# Patient Record
Sex: FEMALE | Race: WHITE
[De-identification: names, ages, dates, MRNs, and addresses within clinical notes are randomized per-mention and may not be internally consistent; named-entity substitution may affect disease eponyms.]

---

## 2019-09-30 ENCOUNTER — HOSPITAL ENCOUNTER (INPATIENT)
Dept: HOSPITAL 63 - GEROPSY | Age: 64
LOS: 11 days | Discharge: HOME | DRG: 885 | End: 2019-10-11
Attending: PSYCHIATRY & NEUROLOGY | Admitting: PSYCHIATRY & NEUROLOGY
Payer: COMMERCIAL

## 2019-09-30 VITALS — DIASTOLIC BLOOD PRESSURE: 69 MMHG | SYSTOLIC BLOOD PRESSURE: 127 MMHG

## 2019-09-30 VITALS — WEIGHT: 126.06 LBS | HEIGHT: 67 IN | BODY MASS INDEX: 19.79 KG/M2

## 2019-09-30 DIAGNOSIS — L30.9: ICD-10-CM

## 2019-09-30 DIAGNOSIS — R45.851: ICD-10-CM

## 2019-09-30 DIAGNOSIS — E44.1: ICD-10-CM

## 2019-09-30 DIAGNOSIS — R13.10: ICD-10-CM

## 2019-09-30 DIAGNOSIS — F63.9: ICD-10-CM

## 2019-09-30 DIAGNOSIS — N39.0: ICD-10-CM

## 2019-09-30 DIAGNOSIS — F41.9: ICD-10-CM

## 2019-09-30 DIAGNOSIS — G30.0: ICD-10-CM

## 2019-09-30 DIAGNOSIS — E87.6: ICD-10-CM

## 2019-09-30 DIAGNOSIS — Z79.899: ICD-10-CM

## 2019-09-30 DIAGNOSIS — Z87.440: ICD-10-CM

## 2019-09-30 DIAGNOSIS — F01.51: ICD-10-CM

## 2019-09-30 DIAGNOSIS — F31.9: Primary | ICD-10-CM

## 2019-09-30 DIAGNOSIS — M19.90: ICD-10-CM

## 2019-09-30 DIAGNOSIS — H54.7: ICD-10-CM

## 2019-09-30 DIAGNOSIS — G47.00: ICD-10-CM

## 2019-09-30 PROCEDURE — 83540 ASSAY OF IRON: CPT

## 2019-09-30 PROCEDURE — 82607 VITAMIN B-12: CPT

## 2019-09-30 PROCEDURE — 36415 COLL VENOUS BLD VENIPUNCTURE: CPT

## 2019-09-30 PROCEDURE — 85025 COMPLETE CBC W/AUTO DIFF WBC: CPT

## 2019-09-30 PROCEDURE — 84436 ASSAY OF TOTAL THYROXINE: CPT

## 2019-09-30 PROCEDURE — 80048 BASIC METABOLIC PNL TOTAL CA: CPT

## 2019-09-30 PROCEDURE — 83735 ASSAY OF MAGNESIUM: CPT

## 2019-09-30 PROCEDURE — 74176 CT ABD & PELVIS W/O CONTRAST: CPT

## 2019-09-30 PROCEDURE — 84443 ASSAY THYROID STIM HORMONE: CPT

## 2019-09-30 PROCEDURE — 86592 SYPHILIS TEST NON-TREP QUAL: CPT

## 2019-09-30 PROCEDURE — 81001 URINALYSIS AUTO W/SCOPE: CPT

## 2019-09-30 PROCEDURE — 83036 HEMOGLOBIN GLYCOSYLATED A1C: CPT

## 2019-09-30 PROCEDURE — 93005 ELECTROCARDIOGRAM TRACING: CPT

## 2019-09-30 PROCEDURE — 80164 ASSAY DIPROPYLACETIC ACD TOT: CPT

## 2019-09-30 PROCEDURE — 82306 VITAMIN D 25 HYDROXY: CPT

## 2019-09-30 PROCEDURE — 90686 IIV4 VACC NO PRSV 0.5 ML IM: CPT

## 2019-09-30 PROCEDURE — 84480 ASSAY TRIIODOTHYRONINE (T3): CPT

## 2019-09-30 PROCEDURE — 87086 URINE CULTURE/COLONY COUNT: CPT

## 2019-09-30 PROCEDURE — 85027 COMPLETE CBC AUTOMATED: CPT

## 2019-09-30 PROCEDURE — 80053 COMPREHEN METABOLIC PANEL: CPT

## 2019-09-30 PROCEDURE — 83550 IRON BINDING TEST: CPT

## 2019-09-30 PROCEDURE — 80061 LIPID PANEL: CPT

## 2019-09-30 PROCEDURE — 90471 IMMUNIZATION ADMIN: CPT

## 2019-09-30 RX ADMIN — DONEPEZIL HYDROCHLORIDE SCH MG: 10 TABLET ORAL at 20:08

## 2019-09-30 RX ADMIN — MEMANTINE HYDROCHLORIDE SCH MG: 10 TABLET ORAL at 20:08

## 2019-09-30 NOTE — PDOC
Exam


Note:


Santos Note:


Please also refer to the separate dictated note~for this date of service 

dictated separately. Discussed the patient with Nursing staff reviewed the 

chart.~Reviewed interim history and current functioning. Reviewed vital 

signs,~Labs/ Radiology~and current medications noted below. Continue current 

treatment with the changes noted in the dictated addendum note





Assessment:


Vital Signs/I&O:





                                   Vital Signs








  Date Time  Temp Pulse Resp B/P (MAP) Pulse Ox O2 Delivery O2 Flow Rate FiO2


 


9/30/19 15:10 98.8 78 18 127/69 (88) 96   











Current Medications:


Meds:





Current Medications








 Medications


  (Trade)  Dose


 Ordered  Sig/Sascha


 Route


 PRN Reason  Start Time


 Stop Time Status Last Admin


Dose Admin


 


 Donepezil HCl


  (Aricept)  10 mg  HS


 PO


   9/30/19 21:00


    9/30/19 20:08





 


 Memantine


  (Namenda)  10 mg  BID


 PO


   9/30/19 21:00


    9/30/19 20:08





 


 Olanzapine


  (ZyPREXA ZYDIS)  20 mg  QHS


 PO


   9/30/19 21:00


    9/30/19 20:08





 


 Influenza Virus


 Vaccine Quadrival


  (Afluria Quad


 2019-20 (3yr Up)


 Syringe)  0.5 ml  ONCE ONCE


 VAX IM


   9/30/19 16:15


 9/30/19 16:40 DC 9/30/19 18:17











I have reviewed the current psychotropics carefully including drug interactions.

 Risk benefit ratio favors no change other than as noted in my dictated progress

note.





Diagnosis:


Problems:  


(1) Anxiety disorder


(2) Dementia in Alzheimer's disease with delusions


(3) Dementia in Alzheimer's disease with depression


(4) Dementia, vascular, with delusions


(5) Dementia, vascular, with depression


(6) Impulse control disorder











DEVAUGHN CACERES MD                 Sep 30, 2019 21:45

## 2019-09-30 NOTE — EKG
[facility] [address], [place] [address]

Test Date:    2019               Test Time:    17:23:24

Pat Name:     ZEKE YEE             Department:   

Patient ID:   [id number]           Room:         [address] [place] [address]

Gender:       F                        Technician:   

:          [id number]               Requested By: DEVAUGHN CACERES

Order Number: [id number]            Reading MD:   Brendon Lopez MD

                                 Measurements

Intervals                              Axis          

Rate:         67                       P:            41

KY:           156                      QRS:          48

QRSD:         78                       T:            67

QT:           484                                    

QTc:          515                                    

                           Interpretive Statements

SINUS RHYTHM

PROLONGED QT



Electronically Signed On 10-9-2019 9:21:43 CDT by Brendon Lopez MD

## 2019-10-01 VITALS — SYSTOLIC BLOOD PRESSURE: 92 MMHG | DIASTOLIC BLOOD PRESSURE: 58 MMHG

## 2019-10-01 VITALS — DIASTOLIC BLOOD PRESSURE: 75 MMHG | SYSTOLIC BLOOD PRESSURE: 126 MMHG

## 2019-10-01 LAB
ALBUMIN SERPL-MCNC: 3.2 G/DL (ref 3.4–5)
ALBUMIN/GLOB SERPL: 1 {RATIO} (ref 1–1.7)
ALP SERPL-CCNC: 86 U/L (ref 46–116)
ALT SERPL-CCNC: 17 U/L (ref 14–59)
ANION GAP SERPL CALC-SCNC: 9 MMOL/L (ref 6–14)
AST SERPL-CCNC: 26 U/L (ref 15–37)
BASOPHILS # BLD AUTO: 0 X10^3/UL (ref 0–0.2)
BASOPHILS NFR BLD: 0 % (ref 0–3)
BILIRUB SERPL-MCNC: 0.5 MG/DL (ref 0.2–1)
BUN/CREAT SERPL: 11 (ref 6–20)
CA-I SERPL ISE-MCNC: 9 MG/DL (ref 7–20)
CALCIUM SERPL-MCNC: 8.7 MG/DL (ref 8.5–10.1)
CHLORIDE SERPL-SCNC: 108 MMOL/L (ref 98–107)
CHOLEST/HDLC SERPL: 3 {RATIO}
CO2 SERPL-SCNC: 27 MMOL/L (ref 21–32)
CREAT SERPL-MCNC: 0.8 MG/DL (ref 0.6–1)
EOSINOPHIL NFR BLD: 0 % (ref 0–3)
EOSINOPHIL NFR BLD: 0 X10^3/UL (ref 0–0.7)
ERYTHROCYTE [DISTWIDTH] IN BLOOD BY AUTOMATED COUNT: 13.2 % (ref 11.5–14.5)
GFR SERPLBLD BASED ON 1.73 SQ M-ARVRAT: 72.7 ML/MIN
GLOBULIN SER-MCNC: 3.1 G/DL (ref 2.2–3.8)
GLUCOSE SERPL-MCNC: 100 MG/DL (ref 70–99)
HCT VFR BLD CALC: 34.7 % (ref 36–47)
HDLC SERPL-MCNC: 40 MG/DL (ref 40–60)
HGB BLD-MCNC: 11.7 G/DL (ref 12–15.5)
LDLC: 91 MG/DL (ref 0–100)
LYMPHOCYTES # BLD: 0.8 X10^3/UL (ref 1–4.8)
LYMPHOCYTES NFR BLD AUTO: 17 % (ref 24–48)
MAGNESIUM SERPL-MCNC: 2 MG/DL (ref 1.8–2.4)
MCH RBC QN AUTO: 31 PG (ref 25–35)
MCHC RBC AUTO-ENTMCNC: 34 G/DL (ref 31–37)
MCV RBC AUTO: 92 FL (ref 79–100)
MONO #: 0.4 X10^3/UL (ref 0–1.1)
MONOCYTES NFR BLD: 10 % (ref 0–9)
NEUT #: 3.2 X10^3UL (ref 1.8–7.7)
NEUTROPHILS NFR BLD AUTO: 72 % (ref 31–73)
PLATELET # BLD AUTO: 180 X10^3/UL (ref 140–400)
POTASSIUM SERPL-SCNC: 3.4 MMOL/L (ref 3.5–5.1)
PROT SERPL-MCNC: 6.3 G/DL (ref 6.4–8.2)
RBC # BLD AUTO: 3.76 X10^6/UL (ref 3.5–5.4)
SODIUM SERPL-SCNC: 144 MMOL/L (ref 136–145)
T3 SERPL-MCNC: 91 NG/DL (ref 71–180)
T4 SERPL-MCNC: 6.3 UG/DL (ref 4.5–12)
THYROID STIM HORMONE (TSH): 1.62 UIU/ML (ref 0.36–3.74)
TRIGL SERPL-MCNC: 54 MG/DL (ref 0–150)
VLDLC: 10 MG/DL (ref 0–40)
WBC # BLD AUTO: 4.4 X10^3/UL (ref 4–11)

## 2019-10-01 RX ADMIN — MIRTAZAPINE SCH MG: 7.5 TABLET, FILM COATED ORAL at 19:58

## 2019-10-01 RX ADMIN — MEMANTINE HYDROCHLORIDE SCH MG: 10 TABLET ORAL at 08:01

## 2019-10-01 RX ADMIN — DONEPEZIL HYDROCHLORIDE SCH MG: 10 TABLET ORAL at 19:58

## 2019-10-01 RX ADMIN — ASPIRIN SCH MG: 81 TABLET, COATED ORAL at 08:01

## 2019-10-01 RX ADMIN — MEMANTINE HYDROCHLORIDE SCH MG: 10 TABLET ORAL at 19:58

## 2019-10-01 NOTE — PN
DATE:  



PROGRESS NOTE



This late entry, 09/30/2019, covers elements not covered in my initial note.



IDENTIFYING DATA:  The patient is a 64-year-old  female referred to us

from the Emergency Room at [facility], where she presented

around midnight of the previous night and I was called at midnight to evaluate

her for admission to our unit.  She has a history of early onset dementia, lives

at home with her , has had increasing agitation against her  who

is her primary caregiver.  She has been irritable, constantly yelling, having

marked insomnia, not eating or drinking, extremely restless and has failed

outpatient psychiatric interventions.  She is a danger to herself due to not

eating and drinking and the behaviors that were totally out of control and

having failed outpatient psychiatric interventions.  She is referred for

inpatient psychiatric stabilization.



CHIEF COMPLAINT:  "No."



HISTORY OF PRESENT ILLNESS:  The patient has a history of dementia, probably

Alzheimer's with delusion, depression.  She has been living at home with her

 and he has tried to maintain her at home until recently.  As noted, she

has appeared more depressed, psychotic, agitated, aggressive, disruptive, not

eating, drinking.  No clear history of bipolar disorder or homicidal ideation. 

She has a passive suicidal ideation noted above.



PAST PSYCHIATRIC HISTORY:  As above.  The patient was diagnosed with Alzheimer

dementia in 2011.



MEDICAL HISTORY:  Positive for recurrent UTIs and UA was positive in the ER,

given 1 dose of Monurol.  She has a history of dehydration and electrolyte

imbalance.  The patient was on hospice care, but that was recently discontinued.

 The patient has been started on 2 liters normal saline.



DIET:  Regular finger foods.



ALLERGIES:  Negative.



Ambulates with total care in a ____ 



FAMILY HISTORY:  Noncontributory.



SOCIAL HISTORY:  No history of alcohol, drug abuse, physical, sexual or elder

abuse.  She is not known to be a perpetrator.



REACTION TO HOSPITALIZATION:  The patient oblivious of this.



ASSETS:  Supportive .



CURRENT PSYCHOTROPICS:  Namenda 10 mg b.i.d., Lexapro 20 mg a day, Aricept 10 mg

at bedtime, Zyprexa 20 mg at bedtime, Valium p.r.n.



MENTAL STATUS EXAMINATION:  The patient was seen individually evening of

09/30/2019.  She is lying in bed, anxious, restless, oriented to herself, not

very verbal.  Insight, judgment, recent and remote memory, attention,

concentration, fund of knowledge poor, consistent with her diagnosis.



IMPRESSION:  Major neurocognitive disorder, Alzheimer's with delusion,

depression, behavioral disturbance; anxiety disorder, unspecified; impulse

control disorder, unspecified.  Rest as above.



PLAN:  Admit to Geropsychiatry Unit at [facility].  I will see the

patient daily individually from a psychiatric standpoint.  Medical followup per

Dr. Harrison.  We will continue the patient on her current psychotropics.  Observe

baseline, then adjust as clinically indicated.  Estimated length of stay 10-12

days.



DISPOSITION PLANS:  Back home or perhaps more appropriately to nursing home

setting.





______________________________

DEVAUGHN CACERES MD



DR:  BIBI/hannah  JOB#:  [id number] / [id number]

DD:  10/01/2019 12:24  DT:  10/01/2019 12:41

## 2019-10-01 NOTE — PDOC
Exam


Note:


Santos Note:


Please also refer to the separate dictated note~for this date of service 

dictated separately.~Patient seen individually. Discussed the patient with 

Nursing staff reviewed the chart.~Reviewed interim history and current 

functioning. Reviewed vital signs,~Labs/ Radiology~and current medications noted

below. Continue current treatment with the changes noted in the dictated 

addendum note





Assessment:


Vital Signs/I&O:





                                   Vital Signs








  Date Time  Temp Pulse Resp B/P (MAP) Pulse Ox O2 Delivery O2 Flow Rate FiO2


 


10/1/19 16:40 97.9 51 16 126/75 (92) 97   














                                    I & O   


 


 9/30/19 9/30/19 10/1/19





 14:59 22:59 06:59


 


Intake Total  240 ml 0 ml


 


Balance  240 ml 0 ml








Labs:





                                Laboratory Tests








Test


 10/1/19


06:30


 


White Blood Count


 4.4 x10^3/uL


(4.0-11.0)


 


Red Blood Count


 3.76 x10^6/uL


(3.50-5.40)


 


Hemoglobin


 11.7 g/dL


(12.0-15.5)  L


 


Hematocrit


 34.7 %


(36.0-47.0)  L


 


Mean Corpuscular Volume


 92 fL ()





 


Mean Corpuscular Hemoglobin 31 pg (25-35)  


 


Mean Corpuscular Hemoglobin


Concent 34 g/dL


(31-37)


 


Red Cell Distribution Width


 13.2 %


(11.5-14.5)


 


Platelet Count


 180 x10^3/uL


(140-400)


 


Neutrophils (%) (Auto) 72 % (31-73)  


 


Lymphocytes (%) (Auto) 17 % (24-48)  L


 


Monocytes (%) (Auto) 10 % (0-9)  H


 


Eosinophils (%) (Auto) 0 % (0-3)  


 


Basophils (%) (Auto) 0 % (0-3)  


 


Neutrophils # (Auto)


 3.2 x10^3uL


(1.8-7.7)


 


Lymphocytes # (Auto)


 0.8 x10^3/uL


(1.0-4.8)  L


 


Monocytes # (Auto)


 0.4 x10^3/uL


(0.0-1.1)


 


Eosinophils # (Auto)


 0.0 x10^3/uL


(0.0-0.7)


 


Basophils # (Auto)


 0.0 x10^3/uL


(0.0-0.2)


 


Sodium Level


 144 mmol/L


(136-145)


 


Potassium Level


 3.4 mmol/L


(3.5-5.1)  L


 


Chloride Level


 108 mmol/L


()  H


 


Carbon Dioxide Level


 27 mmol/L


(21-32)


 


Anion Gap 9 (6-14)  


 


Blood Urea Nitrogen


 9 mg/dL (7-20)





 


Creatinine


 0.8 mg/dL


(0.6-1.0)


 


Estimated GFR


(Cockcroft-Gault) 72.7  





 


BUN/Creatinine Ratio 11 (6-20)  


 


Glucose Level


 100 mg/dL


(70-99)  H


 


Calcium Level


 8.7 mg/dL


(8.5-10.1)


 


Magnesium Level


 2.0 mg/dL


(1.8-2.4)


 


Iron Level


 29 ug/dL


()  L


 


Total Iron Binding Capacity


 256 ug/dL


(250-450)


 


Iron Saturation 11 % (15-34)  L


 


Total Bilirubin


 0.5 mg/dL


(0.2-1.0)


 


Aspartate Amino Transferase


(AST) 26 U/L (15-37)





 


Alanine Aminotransferase (ALT)


 17 U/L (14-59)





 


Alkaline Phosphatase


 86 U/L


()


 


Total Protein


 6.3 g/dL


(6.4-8.2)  L


 


Albumin


 3.2 g/dL


(3.4-5.0)  L


 


Albumin/Globulin Ratio 1.0 (1.0-1.7)  


 


Triglycerides Level


 54 mg/dL


(0-150)


 


Cholesterol Level


 141 mg/dL


(0-200)


 


LDL Cholesterol, Calculated


 91 mg/dL


(0-100)


 


VLDL Cholesterol, Calculated


 10 mg/dL


(0-40)


 


Non-HDL Cholesterol Calculated


 101 mg/dL


(0-129)


 


HDL Cholesterol


 40 mg/dL


(40-60)


 


Cholesterol/HDL Ratio 3.0  


 


25-Hydroxy Vitamin D Total


 25.4 ng/mL


()  L


 


Thyroid Stimulating Hormone


(TSH) 1.616 uIU/mL


(0.358-3.740)


 


Thyroxine (T4)


 6.3 ug/dL


(4.5-12.0)


 


Total Triiodothyronine (TT3)


 91 ng/dL


()


 


Treponema pallidum Antibody


 Nonreactive


(Nonreactive)











Current Medications:


Meds:





Current Medications








 Medications


  (Trade)  Dose


 Ordered  Sig/Sascha


 Route


 PRN Reason  Start Time


 Stop Time Status Last Admin


Dose Admin


 


 Citalopram


 Hydrobromide


  (CeleXA)  40 mg  DAILY


 PO


   10/1/19 09:00


 10/1/19 18:34 DC 10/1/19 08:01





 


 Aspirin


  (Aspirin Enteric


 Coated)  81 mg  DAILY


 PO


   10/1/19 09:00


    10/1/19 08:01





 


 Olanzapine


  (ZyPREXA ZYDIS)  2.5 mg  PRN Q2HR  PRN


 PO


 PSYCHOSIS  10/1/19 16:00


    10/1/19 18:09





 


 Mirtazapine


  (Remeron)  7.5 mg  QHS


 PO


   10/1/19 21:00


    10/1/19 19:58











I have reviewed the current psychotropics carefully including drug interactions.

 Risk benefit ratio favors no change other than as noted in my dictated progress

note.





Diagnosis:


Problems:  


(1) Anxiety disorder


(2) Dementia in Alzheimer's disease with delusions


(3) Dementia in Alzheimer's disease with depression


(4) Dementia, vascular, with delusions


(5) Dementia, vascular, with depression


(6) Impulse control disorder











DEVAUGHN CACERES MD                  Oct 1, 2019 22:18

## 2019-10-01 NOTE — PDOC2
CONSULT


Date of Admission


DATE: 10/1/19 


TIME: 15:34


Reason for Consult:


medical management


History of Present Illness


The patient is a 64-year-old female with early onset Alzheimer's dementia, 

depression.  She has history of UTIs.  She comes to the senior behavioral unit 

after being transferred from [facility].  She lives with her 

 who is her primary caregiver.  Apparently the patient had been more 

aggressive, irritable, confused, with insomnia, not eating or drinking well, 

mostly history is obtained from prior records as the patient cannot provide any 

history.  Apparently the patient had been hospice at home.  This will need to be

revisited with the 


Past Medical History


Alzheimer's disease, depression, recurrent UTIs


Social History


the patient is , no history of smoking/drinking could be provided


Current Medications





Current Medications


Donepezil HCl (Aricept) 10 mg HS PO  Last administered on 9/30/19at 20:08;  

Start 9/30/19 at 21:00


Memantine (Namenda) 10 mg BID PO  Last administered on 10/1/19at 08:01;  Start 

9/30/19 at 21:00


Diazepam (Valium) 4 mg PRN Q4HRS  PRN PO ANXIETY;  Start 9/30/19 at 16:00


Citalopram Hydrobromide (CeleXA) 40 mg DAILY PO  Last administered on 10/1/19at 

08:01;  Start 10/1/19 at 09:00


Olanzapine (ZyPREXA ZYDIS) 20 mg QHS PO  Last administered on 9/30/19at 20:08;  

Start 9/30/19 at 21:00


Aspirin (Aspirin Enteric Coated) 81 mg DAILY PO  Last administered on 10/1/19at 

08:01;  Start 10/1/19 at 09:00


Acetaminophen (Tylenol) 650 mg PRN Q6HRS  PRN PO PAIN / TEMP;  Start 9/30/19 at 

16:00


Multi-Ingredient Ointment (Analgesic Balm) 1 caryl PRN QID  PRN TP MUSCLE PAIN;  

Start 9/30/19 at 16:00


Al Hydroxide/Mg Hydroxide (Mylanta Plus Xs) 15 ml PRN AFTMEALHC  PRN PO 

DYSPEPSIA;  Start 9/30/19 at 16:00


Magnesium Hydroxide (Milk Of Magnesia) 2,400 mg PRN QHS  PRN PO CONSTIPATION;  

Start 9/30/19 at 16:00


Influenza Virus Vaccine Quadrival (Afluria Quad 2019-20 (3yr Up) Syringe) 0.5 ml

ONCE ONCE VAX IM  Last administered on 9/30/19at 18:17;  Start 9/30/19 at 16:15;

 Stop 9/30/19 at 16:40;  Status DC





Active Scripts


Active


Reported


Diazepam Oral Conc (Diazepam) 5 Mg/1 Ml Oral.conc 4 Mg PO PRN Q6HRS PRN


Olanzapine Odt (Olanzapine) 20 Mg Tab.rapdis 20 Mg PO HS


Aspir-Low (Aspirin) 81 Mg Tablet.dr 1 Tab PO DAILY


Donepezil Hcl 10 Mg Tablet 1 Tab PO HS


Escitalopram Oxalate 20 Mg Tablet 1 Tab PO DAILY


Namenda (Memantine Hcl) 10 Mg Tablet 1 Tab PO BID


Allergies:  


Coded Allergies:  


     No Known Drug Allergies (Unverified , 9/30/19)


Review of System


review of systems could not be provided due to her psychiatric illness


Physical Exam


Awake, completely confused, cannot answer even her name


PERRLA, EOMI, appears slightly dry


Regular rate and rhythm


Clear to auscultation bilaterally


Positive bowel sounds, nontender, nondistended


Cranial nerves could not be tested at this time, appears to move all extremities

however neurological exam was very limited


No rashes


No edema


VITALS





Vital Signs








  Date Time  Temp Pulse Resp B/P (MAP) Pulse Ox O2 Delivery O2 Flow Rate FiO2


 


10/1/19 05:58 98.4 56 16 92/58 (69) 98   








Labs





Laboratory Tests








Test


 10/1/19


06:30


 


White Blood Count


 4.4 x10^3/uL


(4.0-11.0)


 


Red Blood Count


 3.76 x10^6/uL


(3.50-5.40)


 


Hemoglobin


 11.7 g/dL


(12.0-15.5)


 


Hematocrit


 34.7 %


(36.0-47.0)


 


Mean Corpuscular Volume 92 fL () 


 


Mean Corpuscular Hemoglobin 31 pg (25-35) 


 


Mean Corpuscular Hemoglobin


Concent 34 g/dL


(31-37)


 


Red Cell Distribution Width


 13.2 %


(11.5-14.5)


 


Platelet Count


 180 x10^3/uL


(140-400)


 


Neutrophils (%) (Auto) 72 % (31-73) 


 


Lymphocytes (%) (Auto) 17 % (24-48) 


 


Monocytes (%) (Auto) 10 % (0-9) 


 


Eosinophils (%) (Auto) 0 % (0-3) 


 


Basophils (%) (Auto) 0 % (0-3) 


 


Neutrophils # (Auto)


 3.2 x10^3uL


(1.8-7.7)


 


Lymphocytes # (Auto)


 0.8 x10^3/uL


(1.0-4.8)


 


Monocytes # (Auto)


 0.4 x10^3/uL


(0.0-1.1)


 


Eosinophils # (Auto)


 0.0 x10^3/uL


(0.0-0.7)


 


Basophils # (Auto)


 0.0 x10^3/uL


(0.0-0.2)


 


Sodium Level


 144 mmol/L


(136-145)


 


Potassium Level


 3.4 mmol/L


(3.5-5.1)


 


Chloride Level


 108 mmol/L


()


 


Carbon Dioxide Level


 27 mmol/L


(21-32)


 


Anion Gap 9 (6-14) 


 


Blood Urea Nitrogen 9 mg/dL (7-20) 


 


Creatinine


 0.8 mg/dL


(0.6-1.0)


 


Estimated GFR


(Cockcroft-Gault) 72.7 





 


BUN/Creatinine Ratio 11 (6-20) 


 


Glucose Level


 100 mg/dL


(70-99)


 


Calcium Level


 8.7 mg/dL


(8.5-10.1)


 


Magnesium Level


 2.0 mg/dL


(1.8-2.4)


 


Iron Level


 29 ug/dL


()


 


Total Iron Binding Capacity


 256 ug/dL


(250-450)


 


Iron Saturation 11 % (15-34) 


 


Total Bilirubin


 0.5 mg/dL


(0.2-1.0)


 


Aspartate Amino Transf


(AST/SGOT) 26 U/L (15-37) 





 


Alanine Aminotransferase


(ALT/SGPT) 17 U/L (14-59) 





 


Alkaline Phosphatase


 86 U/L


()


 


Total Protein


 6.3 g/dL


(6.4-8.2)


 


Albumin


 3.2 g/dL


(3.4-5.0)


 


Albumin/Globulin Ratio 1.0 (1.0-1.7) 


 


Triglycerides Level


 54 mg/dL


(0-150)


 


Cholesterol Level


 141 mg/dL


(0-200)


 


LDL Cholesterol, Calculated


 91 mg/dL


(0-100)


 


VLDL Cholesterol, Calculated


 10 mg/dL


(0-40)


 


Non-HDL Cholesterol Calculated


 101 mg/dL


(0-129)


 


HDL Cholesterol


 40 mg/dL


(40-60)


 


Cholesterol/HDL Ratio 3.0 


 


25-Hydroxy Vitamin D Total


 25.4 ng/mL


()


 


Thyroid Stimulating Hormone


(TSH) 1.616 uIU/mL


(0.358-3.740)


 


Treponema pallidum Antibody


 Nonreactive


(Nonreactive)








Assessment/Plan





Severe Alzheimer's dementia


-Per primary team-psychiatry


-Psychiatric medications per psychiatry


-Ordered B12 level





UTI- treated at [facility] with fosfomycin 1


-Await final results of urine culture





Dysphagia due to anatomical/medical reasons versus psychiatric


-Recommend speech evaluation


-If family wants to be more aggressive, a CAT scan or MRI of the head might be 

needed


-The patient may need a feeding tube





Mild hypokalemia


-Potassium chloride for replacement today





Mild protein calorie malnutrition


-Encourage oral intake





Goals of CARE


-Patient had been hospice at home, this will need to be revisited with the 

 if the patient cannot take good oral intake











GALLITO SNIDER MD             Oct 1, 2019 15:38

## 2019-10-02 VITALS — SYSTOLIC BLOOD PRESSURE: 126 MMHG | DIASTOLIC BLOOD PRESSURE: 79 MMHG

## 2019-10-02 VITALS — SYSTOLIC BLOOD PRESSURE: 106 MMHG | DIASTOLIC BLOOD PRESSURE: 44 MMHG

## 2019-10-02 LAB — HBA1C MFR BLD: 5.3 % (ref 4.8–5.6)

## 2019-10-02 RX ADMIN — DONEPEZIL HYDROCHLORIDE SCH MG: 10 TABLET ORAL at 20:06

## 2019-10-02 RX ADMIN — SERTRALINE HYDROCHLORIDE SCH MG: 50 TABLET ORAL at 08:36

## 2019-10-02 RX ADMIN — ACETAMINOPHEN PRN MG: 325 TABLET, FILM COATED ORAL at 11:14

## 2019-10-02 RX ADMIN — ASPIRIN SCH MG: 81 TABLET, COATED ORAL at 08:34

## 2019-10-02 RX ADMIN — MEMANTINE HYDROCHLORIDE SCH MG: 10 TABLET ORAL at 20:06

## 2019-10-02 RX ADMIN — MEMANTINE HYDROCHLORIDE SCH MG: 10 TABLET ORAL at 08:34

## 2019-10-02 RX ADMIN — MIRTAZAPINE SCH MG: 7.5 TABLET, FILM COATED ORAL at 20:06

## 2019-10-02 NOTE — PDOC
Exam


Note:


Santos Note:


Please also refer to the separate dictated note~for this date of service 

dictated separately.~Patient seen individually. Discussed the patient with 

Nursing staff reviewed the chart.~Reviewed interim history and current 

functioning. Reviewed vital signs,~Labs/ Radiology~and current medications noted

below. Continue current treatment with the changes noted in the dictated 

addendum note





Assessment:


Vital Signs/I&O:





                                   Vital Signs








  Date Time  Temp Pulse Resp B/P (MAP) Pulse Ox O2 Delivery O2 Flow Rate FiO2


 


10/2/19 16:10 97.5 95 18 106/44 (64) 95   














                                    I & O   


 


 10/1/19 10/1/19 10/2/19





 14:59 22:59 06:59


 


Intake Total 200 ml  60 ml


 


Balance 200 ml  60 ml











Current Medications:


Meds:





Current Medications








 Medications


  (Trade)  Dose


 Ordered  Sig/Sascha


 Route


 PRN Reason  Start Time


 Stop Time Status Last Admin


Dose Admin


 


 Sertraline HCl


  (Zoloft)  50 mg  DAILY


 PO


   10/2/19 09:00


    10/2/19 08:36











I have reviewed the current psychotropics carefully including drug interactions.

 Risk benefit ratio favors no change other than as noted in my dictated progress

note.





Diagnosis:


Problems:  


(1) Anxiety disorder


(2) Dementia in Alzheimer's disease with delusions


(3) Dementia in Alzheimer's disease with depression


(4) Dementia, vascular, with delusions


(5) Dementia, vascular, with depression


(6) Impulse control disorder











DEVAUGHN CACERES MD                  Oct 2, 2019 21:32

## 2019-10-03 VITALS — SYSTOLIC BLOOD PRESSURE: 116 MMHG | DIASTOLIC BLOOD PRESSURE: 76 MMHG

## 2019-10-03 VITALS — SYSTOLIC BLOOD PRESSURE: 99 MMHG | DIASTOLIC BLOOD PRESSURE: 59 MMHG

## 2019-10-03 LAB
APTT PPP: (no result) S
BACTERIA #/AREA URNS HPF: 0 /HPF
BILIRUB UR QL STRIP: (no result)
FIBRINOGEN PPP-MCNC: (no result) MG/DL
GLUCOSE UR STRIP-MCNC: (no result) MG/DL
NITRITE UR QL STRIP: (no result)
RBC #/AREA URNS HPF: 0 /HPF (ref 0–2)
SP GR UR STRIP: 1.02
SQUAMOUS #/AREA URNS LPF: (no result) /LPF
UROBILINOGEN UR-MCNC: 2 MG/DL
WBC #/AREA URNS HPF: (no result) /HPF (ref 0–4)

## 2019-10-03 RX ADMIN — HYDROCORTISONE SCH APP: 25 OINTMENT TOPICAL at 21:00

## 2019-10-03 RX ADMIN — SERTRALINE HYDROCHLORIDE SCH MG: 50 TABLET ORAL at 07:23

## 2019-10-03 RX ADMIN — MIRTAZAPINE SCH MG: 7.5 TABLET, FILM COATED ORAL at 20:10

## 2019-10-03 RX ADMIN — LIDOCAINE SCH PATCH: 50 PATCH CUTANEOUS at 08:49

## 2019-10-03 RX ADMIN — DONEPEZIL HYDROCHLORIDE SCH MG: 10 TABLET ORAL at 20:10

## 2019-10-03 RX ADMIN — MEMANTINE HYDROCHLORIDE SCH MG: 10 TABLET ORAL at 07:23

## 2019-10-03 RX ADMIN — MEMANTINE HYDROCHLORIDE SCH MG: 10 TABLET ORAL at 20:10

## 2019-10-03 RX ADMIN — ASPIRIN SCH MG: 81 TABLET, COATED ORAL at 07:23

## 2019-10-03 RX ADMIN — HYDROCORTISONE SCH APP: 25 OINTMENT TOPICAL at 07:23

## 2019-10-03 NOTE — PN
DATE:  



SUBJECTIVE:  The patient was ____ agitation against primary caregivers,

irritability, yelling, insomnia, not eating and drinking, and restless.  The

patient was having severe pain to her shoulders, right and left with decreased

range of motion, unable to lift them above the horizontal plane.  She was noted

to have a rash underneath her lip just above the chin area, probably some form

of dermatitis.  Other than that, the patient had no major complaints except for

the pain in her shoulders.



OBJECTIVE:

VITAL SIGNS:  Otherwise, blood pressure is 126/80, respiratory rate 24, pulse

90, afebrile.

NEUROLOGIC:  The patient was alert and just concerned with her pain that she was

having in her shoulders.  The patient's ____ was unremarkable there.



ASSESSMENT:  The patient has underlying diagnosis of anxiety disorder, dementia,

and Alzheimer disease with delusions, depression, probably rotator cuff injuries

to both right and left shoulders with degenerative arthritis to the right and

left shoulders, impulse control disorder, as well as dermatitis to a facial

area.



PLAN:  The patient will be adjusted on her medications and hopefully get some

relief to the pain in her shoulders as well as being treated for her dermatitis

underneath her lower lip area.





______________________________

DELL YING MD



DR:  SHANNAN/hannah  JOB#:  [id number] / [id number]

DD:  10/02/2019 22:14  DT:  10/03/2019 15:08

## 2019-10-03 NOTE — HP
ADMIT DATE:  



PROGRESS NOTE



This late entry, 09/30/2019, covers elements not covered in my initial note.



IDENTIFYING DATA:  The patient is a 64-year-old  female referred to us

from the Emergency Room at [facility], where she presented

around midnight of the previous night and I was called at midnight to evaluate

her for admission to our unit.  She has a history of early onset dementia, lives

at home with her , has had increasing agitation against her  who

is her primary caregiver.  She has been irritable, constantly yelling, having

marked insomnia, not eating or drinking, extremely restless and has failed

outpatient psychiatric interventions.  She is a danger to herself due to not

eating and drinking and the behaviors that were totally out of control and

having failed outpatient psychiatric interventions.  She is referred for

inpatient psychiatric stabilization.



CHIEF COMPLAINT:  "No."



HISTORY OF PRESENT ILLNESS:  The patient has a history of dementia, probably

Alzheimer's with delusion, depression.  She has been living at home with her

 and he has tried to maintain her at home until recently.  As noted, she

has appeared more depressed, psychotic, agitated, aggressive, disruptive, not

eating, drinking.  No clear history of bipolar disorder or homicidal ideation. 

She has a passive suicidal ideation noted above.



PAST PSYCHIATRIC HISTORY:  As above.  The patient was diagnosed with Alzheimer

dementia in 2011.



MEDICAL HISTORY:  Positive for recurrent UTIs and UA was positive in the ER,

given 1 dose of Monurol.  She has a history of dehydration and electrolyte

imbalance.  The patient was on hospice care, but that was recently discontinued.

 The patient has been started on 2 liters normal saline.



DIET:  Regular finger foods.



ALLERGIES:  Negative.



Ambulates with total care in a ____ 



FAMILY HISTORY:  Noncontributory.



SOCIAL HISTORY:  No history of alcohol, drug abuse, physical, sexual or elder

abuse.  She is not known to be a perpetrator.



REACTION TO HOSPITALIZATION:  The patient oblivious of this.



ASSETS:  Supportive .



CURRENT PSYCHOTROPICS:  Namenda 10 mg b.i.d., Lexapro 20 mg a day, Aricept 10 mg

at bedtime, Zyprexa 20 mg at bedtime, Valium p.r.n.



MENTAL STATUS EXAMINATION:  The patient was seen individually evening of

09/30/2019.  She is lying in bed, anxious, restless, oriented to herself, not

very verbal.  Insight, judgment, recent and remote memory, attention,

concentration, fund of knowledge poor, consistent with her diagnosis.



IMPRESSION:  Major neurocognitive disorder, Alzheimer's with delusion,

depression, behavioral disturbance; anxiety disorder, unspecified; impulse

control disorder, unspecified.  Rest as above.



PLAN:  Admit to Geropsychiatry Unit at [facility].  I will see the

patient daily individually from a psychiatric standpoint.  Medical followup per

Dr. Harrison.  We will continue the patient on her current psychotropics.  Observe

baseline, then adjust as clinically indicated.  Estimated length of stay 10-12

days.



DISPOSITION PLANS:  Back home or perhaps more appropriately to nursing home

setting.





______________________________

DEVAUGHN CACERES MD



DR:  BIBI/nts  JOB#:  [id number] / [id number]

DD:  10/01/2019 12:24  DT:  10/01/2019 12:41

## 2019-10-03 NOTE — PDOC
Exam


Note:


Santos Note:


Please also refer to the separate dictated note~for this date of service 

dictated separately.~Patient seen individually. Discussed the patient with 

Nursing staff reviewed the chart.~Reviewed interim history and current 

functioning. Reviewed vital signs,~Labs/ Radiology~and current medications noted

below. Continue current treatment with the changes noted in the dictated 

addendum note





Assessment:


Vital Signs/I&O:





                                   Vital Signs








  Date Time  Temp Pulse Resp B/P (MAP) Pulse Ox O2 Delivery O2 Flow Rate FiO2


 


10/3/19 17:06 98.2 95 16 116/76 (89) 95   














                                    I & O   


 


 10/2/19 10/2/19 10/3/19





 15:00 23:00 07:00


 


Intake Total 480 ml 60 ml 


 


Balance 480 ml 60 ml 








Labs:





                                Laboratory Tests








Test


 10/3/19


17:15


 


Urine Collection Type U cath  


 


Urine Color Renetta  


 


Urine Clarity Hazy  


 


Urine pH 6.0  


 


Urine Specific Gravity 1.025  


 


Urine Protein


 Trace


(NEG-TRACE)


 


Urine Glucose (UA)


 Neg mg/dL


(NEG)


 


Urine Ketones (Stick)


 Trace mg/dL


(NEG)


 


Urine Blood Neg (NEG)  


 


Urine Nitrite Pos (NEG)  


 


Urine Bilirubin Neg (NEG)  


 


Urine Urobilinogen Dipstick


 2 mg/dL (0.2


mg/dL)


 


Urine Leukocyte Esterase Neg (NEG)  


 


Urine RBC 0 /HPF (0-2)  


 


Urine WBC


 1-4 /HPF (0-4)





 


Urine Squamous Epithelial


Cells Occ /LPF  





 


Urine Bacteria


 0 /HPF (0-FEW)





 


Urine Mucus Mod /LPF  











Current Medications:


Meds:





Current Medications








 Medications


  (Trade)  Dose


 Ordered  Sig/Sascha


 Route


 PRN Reason  Start Time


 Stop Time Status Last Admin


Dose Admin


 


 Lidocaine


  (Lidoderm)  1 patch  DAILY


 TD


   10/3/19 09:00


    10/3/19 08:49





 


 Hydrocortisone


  (Hytone)  1 caryl  BID


 TP


   10/3/19 09:00


    10/3/19 21:00











I have reviewed the current psychotropics carefully including drug interactions.

 Risk benefit ratio favors no change other than as noted in my dictated progress

note.





Diagnosis:


Problems:  


(1) Anxiety disorder


(2) Dementia in Alzheimer's disease with delusions


(3) Dementia in Alzheimer's disease with depression


(4) Dementia, vascular, with delusions


(5) Dementia, vascular, with depression


(6) Impulse control disorder


(7) Protein calorie malnutrition


(8) Hypokalemia











DEVAUGHN CACERES MD                  Oct 3, 2019 21:52

## 2019-10-03 NOTE — PN
DATE:  10/01/2019



PSYCHIATRIC PROGRESS NOTE



This late entry, 10/01, covers elements not covered in my initial note.



SUBJECTIVE:  Per report from SHANDA Zhang, patient remains confused, slept 7 hours

previous night.  Oral intake is poor, ate nothing for lunch or breakfast.  She

has been speaking sing song, babbling and then intermittently screaming,

hallucinating.



REVIEW OF SYSTEMS:  Ambulation impaired, lying in bed.  No CV, , pulmonary,

eye, ENT system symptoms on review.  Reliability poor.



MENTAL STATUS EXAM:  Oriented to herself.  Insight, judgment, recent and remote

memory, attention, concentration, fund of knowledge poor, consistent with her

diagnosis.



IMPRESSION:  Major neurocognitive disorder; Alzheimer, vascular with delusion;

depression; behavioral disturbance; anxiety disorder, unspecified; impulse

control disorder, unspecified; status post urinary tract infection.  Rest

unchanged.



PLAN:  We will go ahead and drop her Lexapro from 20 mg a day down to 10 mg a

day.  Stop the Valium p.r.n.  Change the Zyprexa 20 mg at bedtime scheduled to

Zyprexa 2.5 mg q. 2 hours p.r.n. psychosis, agitation, max 10 mg in 24 hours. 

Start Remeron 7.5 mg at bedtime in place of Zyprexa to help with her insomnia,

anxiety and mood symptoms.  We will make further changes as clinically indicated

including considering Depakote as a mood stabilizer.





______________________________

MAN ART CACERES MD



DR:  BIBI/hannah  JOB#:  [id number] / [id number]

DD:  10/02/2019 17:17  DT:  10/03/2019 06:42

## 2019-10-04 VITALS — SYSTOLIC BLOOD PRESSURE: 129 MMHG | DIASTOLIC BLOOD PRESSURE: 92 MMHG

## 2019-10-04 VITALS — SYSTOLIC BLOOD PRESSURE: 123 MMHG | DIASTOLIC BLOOD PRESSURE: 82 MMHG

## 2019-10-04 RX ADMIN — MEMANTINE HYDROCHLORIDE SCH MG: 10 TABLET ORAL at 19:32

## 2019-10-04 RX ADMIN — ASPIRIN SCH MG: 81 TABLET, COATED ORAL at 07:54

## 2019-10-04 RX ADMIN — DONEPEZIL HYDROCHLORIDE SCH MG: 10 TABLET ORAL at 19:32

## 2019-10-04 RX ADMIN — DIVALPROEX SODIUM SCH MG: 125 CAPSULE, COATED PELLETS ORAL at 17:04

## 2019-10-04 RX ADMIN — DIVALPROEX SODIUM SCH MG: 125 CAPSULE, COATED PELLETS ORAL at 07:59

## 2019-10-04 RX ADMIN — LIDOCAINE SCH PATCH: 50 PATCH CUTANEOUS at 07:54

## 2019-10-04 RX ADMIN — HYDROCORTISONE SCH APP: 25 OINTMENT TOPICAL at 08:00

## 2019-10-04 RX ADMIN — MIRTAZAPINE SCH MG: 7.5 TABLET, FILM COATED ORAL at 19:32

## 2019-10-04 RX ADMIN — MEMANTINE HYDROCHLORIDE SCH MG: 10 TABLET ORAL at 07:55

## 2019-10-04 RX ADMIN — HYDROCORTISONE SCH APP: 25 OINTMENT TOPICAL at 19:32

## 2019-10-04 RX ADMIN — SERTRALINE HYDROCHLORIDE SCH MG: 50 TABLET ORAL at 07:54

## 2019-10-04 NOTE — PDOC
Exam


Note:


Santos Note:


Please also refer to the separate dictated note~for this date of service 

dictated separately.~Patient seen individually. Discussed the patient with 

Nursing staff reviewed the chart.~Reviewed interim history and current 

functioning. Reviewed vital signs,~Labs/ Radiology~and current medications noted

below. Continue current treatment with the changes noted in the dictated 

addendum note





Assessment:


Vital Signs/I&O:





                                   Vital Signs








  Date Time  Temp Pulse Resp B/P (MAP) Pulse Ox O2 Delivery O2 Flow Rate FiO2


 


10/4/19 16:29 97.9 83 18 129/92 (104) 98   














                                    I & O   


 


 10/3/19 10/3/19 10/4/19





 15:00 23:00 07:00


 


Intake Total 480 ml 680 ml 120 ml


 


Balance 480 ml 680 ml 120 ml











Current Medications:


Meds:





Current Medications








 Medications


  (Trade)  Dose


 Ordered  Sig/Sascha


 Route


 PRN Reason  Start Time


 Stop Time Status Last Admin


Dose Admin


 


 Divalproex Sodium


  (Depakote


 Sprinkles)  125 mg  0900,1700


 PO


   10/4/19 09:00


    10/4/19 17:04











I have reviewed the current psychotropics carefully including drug interactions.

 Risk benefit ratio favors no change other than as noted in my dictated progress

note.





Diagnosis:


Problems:  


(1) Anxiety disorder


(2) Dementia in Alzheimer's disease with delusions


(3) Dementia in Alzheimer's disease with depression


(4) Dementia, vascular, with delusions


(5) Dementia, vascular, with depression


(6) Impulse control disorder











DEVAUGHN CACERES MD                  Oct 4, 2019 21:42

## 2019-10-04 NOTE — PN
DATE:  10/03/2019



PSYCHIATRIC PROGRESS NOTE



This late entry 10/03/2019 covers elements not covered in my initial note.



SUBJECTIVE:  I met with the patient evening of 10/03/2019 and staffed at a

treatment team meeting in the morning.  The patient's appetite 25-50%, sleeping

average 6 hours.  Continues to be confused, intermittent agitation, yelling,

disruptive.  She has an accent and staff reports she is originally from [facility].



REVIEW OF SYSTEMS:  Ambulation impaired.  No CV, , pulmonary, eye, ENT system

symptoms on review.  Reliability poor.



MENTAL STATUS EXAMINATION:  Oriented to herself.  Insight, judgment, recent and

remote memory, attention, concentration, fund of knowledge poor, consistent with

her diagnosis mentioned in my initial note.



IMPRESSION:  Major neurocognitive disorder, Alzheimer, vascular with delusion,

depression, behavioral disturbance; anxiety disorder, unspecified; impulse

control disorder, unspecified; status post urinary tract infection.



PLAN:  Continue Zoloft 50 mg a day in place of the Lexapro 10 mg a day, Namenda

10 mg b.i.d., Aricept 10 mg a day, though I am not sure what benefit these 2

might have.  We will discontinue it later.  Maintain Remeron 7.5 mg at bedtime. 

Start Depakote Sprinkles 125 mg 9 a.m., 5:00 p.m.  Check CBC, CMP, valproic acid

level in 3 days.  Adjust further as clinically indicated.





______________________________

MAN ART CACERES MD



DR:  BIBI/hannah  JOB#:  [id number] / [id number]

DD:  10/04/2019 13:50  DT:  10/04/2019 22:05

## 2019-10-05 VITALS — SYSTOLIC BLOOD PRESSURE: 109 MMHG | DIASTOLIC BLOOD PRESSURE: 74 MMHG

## 2019-10-05 VITALS — SYSTOLIC BLOOD PRESSURE: 124 MMHG | DIASTOLIC BLOOD PRESSURE: 82 MMHG

## 2019-10-05 RX ADMIN — LIDOCAINE SCH PATCH: 50 PATCH CUTANEOUS at 07:48

## 2019-10-05 RX ADMIN — MIRTAZAPINE SCH MG: 7.5 TABLET, FILM COATED ORAL at 20:44

## 2019-10-05 RX ADMIN — DONEPEZIL HYDROCHLORIDE SCH MG: 10 TABLET ORAL at 20:44

## 2019-10-05 RX ADMIN — MEMANTINE HYDROCHLORIDE SCH MG: 10 TABLET ORAL at 20:44

## 2019-10-05 RX ADMIN — ASPIRIN SCH MG: 81 TABLET, COATED ORAL at 07:45

## 2019-10-05 RX ADMIN — DIVALPROEX SODIUM SCH MG: 125 CAPSULE, COATED PELLETS ORAL at 17:08

## 2019-10-05 RX ADMIN — DIVALPROEX SODIUM SCH MG: 125 CAPSULE, COATED PELLETS ORAL at 07:46

## 2019-10-05 RX ADMIN — HYDROCORTISONE SCH APP: 25 OINTMENT TOPICAL at 20:44

## 2019-10-05 RX ADMIN — HYDROCORTISONE SCH APP: 25 OINTMENT TOPICAL at 08:00

## 2019-10-05 RX ADMIN — MEMANTINE HYDROCHLORIDE SCH MG: 10 TABLET ORAL at 07:46

## 2019-10-05 RX ADMIN — SERTRALINE HYDROCHLORIDE SCH MG: 50 TABLET ORAL at 07:46

## 2019-10-05 NOTE — PDOC
Exam


Note:


Santos Note:


Please also refer to the separate dictated note~for this date of service 

dictated separately.~Patient seen individually. Discussed the patient with 

Nursing staff reviewed the chart.~Reviewed interim history and current 

functioning. Reviewed vital signs,~Labs/ Radiology~and current medications noted

below. Continue current treatment with the changes noted in the dictated 

addendum note





Assessment:


Vital Signs/I&O:





                                   Vital Signs








  Date Time  Temp Pulse Resp B/P (MAP) Pulse Ox O2 Delivery O2 Flow Rate FiO2


 


10/5/19 15:49 97.8 61 20 124/82 (96) 98 Room Air  














                                    I & O   


 


 10/4/19 10/4/19 10/5/19





 15:00 23:00 07:00


 


Intake Total 720 ml 240 ml 


 


Balance 720 ml 240 ml 











Current Medications:


I have reviewed the current psychotropics carefully including drug interactions.

 Risk benefit ratio favors no change other than as noted in my dictated progress

note.





Diagnosis:


Problems:  


(1) Anxiety disorder


(2) Dementia in Alzheimer's disease with delusions


(3) Dementia in Alzheimer's disease with depression


(4) Dementia, vascular, with delusions


(5) Dementia, vascular, with depression


(6) Impulse control disorder


(7) Protein calorie malnutrition


(8) Hypokalemia











DEVAUGHN CACERES MD                  Oct 5, 2019 21:56

## 2019-10-06 VITALS — DIASTOLIC BLOOD PRESSURE: 80 MMHG | SYSTOLIC BLOOD PRESSURE: 133 MMHG

## 2019-10-06 VITALS — DIASTOLIC BLOOD PRESSURE: 85 MMHG | SYSTOLIC BLOOD PRESSURE: 135 MMHG

## 2019-10-06 RX ADMIN — ASPIRIN SCH MG: 81 TABLET, COATED ORAL at 07:38

## 2019-10-06 RX ADMIN — MEMANTINE HYDROCHLORIDE SCH MG: 10 TABLET ORAL at 20:45

## 2019-10-06 RX ADMIN — LIDOCAINE SCH PATCH: 50 PATCH CUTANEOUS at 07:39

## 2019-10-06 RX ADMIN — HYDROCORTISONE SCH APP: 25 OINTMENT TOPICAL at 07:40

## 2019-10-06 RX ADMIN — DIVALPROEX SODIUM SCH MG: 125 CAPSULE, COATED PELLETS ORAL at 17:09

## 2019-10-06 RX ADMIN — DIVALPROEX SODIUM SCH MG: 125 CAPSULE, COATED PELLETS ORAL at 07:38

## 2019-10-06 RX ADMIN — DONEPEZIL HYDROCHLORIDE SCH MG: 10 TABLET ORAL at 20:45

## 2019-10-06 RX ADMIN — HYDROCORTISONE SCH APP: 25 OINTMENT TOPICAL at 20:45

## 2019-10-06 RX ADMIN — MIRTAZAPINE SCH MG: 7.5 TABLET, FILM COATED ORAL at 20:45

## 2019-10-06 RX ADMIN — SERTRALINE HYDROCHLORIDE SCH MG: 50 TABLET ORAL at 07:38

## 2019-10-06 RX ADMIN — MEMANTINE HYDROCHLORIDE SCH MG: 10 TABLET ORAL at 07:38

## 2019-10-07 VITALS — SYSTOLIC BLOOD PRESSURE: 113 MMHG | DIASTOLIC BLOOD PRESSURE: 61 MMHG

## 2019-10-07 VITALS — DIASTOLIC BLOOD PRESSURE: 76 MMHG | SYSTOLIC BLOOD PRESSURE: 112 MMHG

## 2019-10-07 LAB
ALBUMIN SERPL-MCNC: 3 G/DL (ref 3.4–5)
ALBUMIN/GLOB SERPL: 0.9 {RATIO} (ref 1–1.7)
ALP SERPL-CCNC: 80 U/L (ref 46–116)
ALT SERPL-CCNC: 24 U/L (ref 14–59)
ANION GAP SERPL CALC-SCNC: 7 MMOL/L (ref 6–14)
AST SERPL-CCNC: 19 U/L (ref 15–37)
BASOPHILS # BLD AUTO: 0 X10^3/UL (ref 0–0.2)
BASOPHILS NFR BLD: 1 % (ref 0–3)
BILIRUB SERPL-MCNC: 0.1 MG/DL (ref 0.2–1)
BUN/CREAT SERPL: 37 (ref 6–20)
CA-I SERPL ISE-MCNC: 26 MG/DL (ref 7–20)
CALCIUM SERPL-MCNC: 8.8 MG/DL (ref 8.5–10.1)
CHLORIDE SERPL-SCNC: 108 MMOL/L (ref 98–107)
CO2 SERPL-SCNC: 30 MMOL/L (ref 21–32)
CREAT SERPL-MCNC: 0.7 MG/DL (ref 0.6–1)
EOSINOPHIL NFR BLD: 0.2 X10^3/UL (ref 0–0.7)
EOSINOPHIL NFR BLD: 4 % (ref 0–3)
ERYTHROCYTE [DISTWIDTH] IN BLOOD BY AUTOMATED COUNT: 13.3 % (ref 11.5–14.5)
GFR SERPLBLD BASED ON 1.73 SQ M-ARVRAT: 84.8 ML/MIN
GLOBULIN SER-MCNC: 3.4 G/DL (ref 2.2–3.8)
GLUCOSE SERPL-MCNC: 93 MG/DL (ref 70–99)
HCT VFR BLD CALC: 36.1 % (ref 36–47)
HGB BLD-MCNC: 12.1 G/DL (ref 12–15.5)
LYMPHOCYTES # BLD: 1.2 X10^3/UL (ref 1–4.8)
LYMPHOCYTES NFR BLD AUTO: 29 % (ref 24–48)
MCH RBC QN AUTO: 31 PG (ref 25–35)
MCHC RBC AUTO-ENTMCNC: 34 G/DL (ref 31–37)
MCV RBC AUTO: 92 FL (ref 79–100)
MONO #: 0.4 X10^3/UL (ref 0–1.1)
MONOCYTES NFR BLD: 8 % (ref 0–9)
NEUT #: 2.4 X10^3UL (ref 1.8–7.7)
NEUTROPHILS NFR BLD AUTO: 58 % (ref 31–73)
PLATELET # BLD AUTO: 231 X10^3/UL (ref 140–400)
POTASSIUM SERPL-SCNC: 4 MMOL/L (ref 3.5–5.1)
PROT SERPL-MCNC: 6.4 G/DL (ref 6.4–8.2)
RBC # BLD AUTO: 3.9 X10^6/UL (ref 3.5–5.4)
SODIUM SERPL-SCNC: 145 MMOL/L (ref 136–145)
VAL ACID: 21 MCG/ML (ref 50–100)
WBC # BLD AUTO: 4.2 X10^3/UL (ref 4–11)

## 2019-10-07 RX ADMIN — HYDROCORTISONE SCH APP: 25 OINTMENT TOPICAL at 07:50

## 2019-10-07 RX ADMIN — ASPIRIN SCH MG: 81 TABLET, COATED ORAL at 07:48

## 2019-10-07 RX ADMIN — MEMANTINE HYDROCHLORIDE SCH MG: 10 TABLET ORAL at 19:21

## 2019-10-07 RX ADMIN — DIVALPROEX SODIUM SCH MG: 125 CAPSULE, COATED PELLETS ORAL at 07:48

## 2019-10-07 RX ADMIN — MEMANTINE HYDROCHLORIDE SCH MG: 10 TABLET ORAL at 07:48

## 2019-10-07 RX ADMIN — DIVALPROEX SODIUM SCH MG: 125 CAPSULE, COATED PELLETS ORAL at 16:47

## 2019-10-07 RX ADMIN — MIRTAZAPINE SCH MG: 7.5 TABLET, FILM COATED ORAL at 19:21

## 2019-10-07 RX ADMIN — HYDROCORTISONE SCH APP: 25 OINTMENT TOPICAL at 21:00

## 2019-10-07 RX ADMIN — LIDOCAINE SCH PATCH: 50 PATCH CUTANEOUS at 07:50

## 2019-10-07 RX ADMIN — DONEPEZIL HYDROCHLORIDE SCH MG: 10 TABLET ORAL at 19:21

## 2019-10-07 RX ADMIN — SERTRALINE HYDROCHLORIDE SCH MG: 50 TABLET ORAL at 07:48

## 2019-10-07 NOTE — PN
DATE:  10/06/2019



SUBJECTIVE:  The patient was seen today, met with the staff, chart reviewed and

also covering for Dr. Casas.  Staff reports no problems.  The patient continues

to exhibit some mood swings, irritability and inability to take care of her

needs, needing supervision.



OBSERVATION:

VITAL SIGNS:  Temperature 97.9, blood pressure 132/80, pulse 73, respirations

16, O2 sat 96%.

GENERAL:  Slept about 6 hours last night.  Her appetite decreased.



MEDICATIONS:  Reviewed.  Currently, she is on Depakote 125 mg twice a day,

Zoloft 50 mg daily, mirtazapine 7.5 mg at night, Namenda 10 mg b.i.d. and

Aricept 10 mg at night.



The patient also evaluated for hospice care.



ASSESSMENT:  Major neurocognitive disorder, most likely Alzheimer's with

delusions and depression and behavioral disturbances; anxiety disorder,

unspecified.



PLAN:  The patient to be evaluated with regard to her ability to return home and

also whether she needs placement, preferably a nursing home.





______________________________

TWILA MAGUIRE MD



DR:  ADALGISA/hannah  JOB#:  [id number] / [id number]

DD:  10/06/2019 17:58  DT:  10/07/2019 05:27

## 2019-10-07 NOTE — PN
DATE:  10/06/2019



SUBJECTIVE:  The patient was seen today, met with the staff, chart reviewed and

also covering for Dr. Casas.  The patient continues to have problems.  She is on

wheelchair.  The patient apparently had a recent UTI.  The patient also has

visual impairment.  The patient is constantly agitated, demanding and yelling. 

The patient is not able to follow directions.



OBSERVATION:

VITAL SIGNS:  Temperature 96.3, blood pressure 90/67, pulse 88, respirations 16,

O2 sat 98%.

GENERAL:  Slept about 7 hours last night.



CURRENT MEDICATIONS:  Reviewed.  Currently on Depakote 500 mg at night,

olanzapine 5 mg b.i.d., which was started recently, is also on mirtazapine 15 mg

at night, Keppra 500 mg b.i.d., BuSpar 15 mg b.i.d., trazodone 50 mg at night.



LABORATORY DATA:  The patient's lab reviewed.  The patient's ammonia level was

51.  Otherwise, no change from previous levels.



ASSESSMENT:

1.  Bipolar disorder type 1 with psychotic features.

2.  Major neurocognitive disorder, multifactorial with delusions, depression,

behavioral disturbances, and anxiety disorder, unspecified.



PLAN:  Continue monitoring her behaviors and plan for her to return to the

nursing home.





______________________________

TWILA MAGUIRE MD



DR:  ADALGISA/hannah  JOB#:  [id number] / [id number]

DD:  10/06/2019 18:03  DT:  10/07/2019 04:42

## 2019-10-08 VITALS — SYSTOLIC BLOOD PRESSURE: 124 MMHG | DIASTOLIC BLOOD PRESSURE: 84 MMHG

## 2019-10-08 VITALS — DIASTOLIC BLOOD PRESSURE: 79 MMHG | SYSTOLIC BLOOD PRESSURE: 116 MMHG

## 2019-10-08 RX ADMIN — ASPIRIN SCH MG: 81 TABLET, COATED ORAL at 07:33

## 2019-10-08 RX ADMIN — DIVALPROEX SODIUM SCH MG: 125 CAPSULE, COATED PELLETS ORAL at 16:45

## 2019-10-08 RX ADMIN — DONEPEZIL HYDROCHLORIDE SCH MG: 10 TABLET ORAL at 20:31

## 2019-10-08 RX ADMIN — MIRTAZAPINE SCH MG: 7.5 TABLET, FILM COATED ORAL at 20:31

## 2019-10-08 RX ADMIN — LIDOCAINE SCH PATCH: 50 PATCH CUTANEOUS at 07:34

## 2019-10-08 RX ADMIN — MEMANTINE HYDROCHLORIDE SCH MG: 10 TABLET ORAL at 07:34

## 2019-10-08 RX ADMIN — SERTRALINE HYDROCHLORIDE SCH MG: 50 TABLET ORAL at 07:33

## 2019-10-08 RX ADMIN — MEMANTINE HYDROCHLORIDE SCH MG: 10 TABLET ORAL at 20:31

## 2019-10-08 RX ADMIN — HYDROCORTISONE SCH APP: 25 OINTMENT TOPICAL at 07:35

## 2019-10-08 RX ADMIN — DIVALPROEX SODIUM SCH MG: 125 CAPSULE, COATED PELLETS ORAL at 07:33

## 2019-10-08 RX ADMIN — HYDROCORTISONE SCH APP: 25 OINTMENT TOPICAL at 20:34

## 2019-10-08 NOTE — PDOC
Exam


Note:


Santos Note:


Please also refer to the separate dictated note~for this date of service 

dictated separately.~Patient seen individually. Discussed the patient with 

Nursing staff reviewed the chart.~Reviewed interim history and current 

functioning. Reviewed vital signs,~Labs/ Radiology~and current medications noted

below. Continue current treatment with the changes noted in the dictated 

addendum note





Assessment:


Vital Signs/I&O:





                                   Vital Signs








  Date Time  Temp Pulse Resp B/P (MAP) Pulse Ox O2 Delivery O2 Flow Rate FiO2


 


10/8/19 16:24 97.5 62 16 116/79 (91) 97   


 


10/8/19 05:15      Room Air  














                                    I & O   


 


 10/7/19 10/7/19 10/8/19





 15:00 23:00 07:00


 


Intake Total 720 ml 600 ml 


 


Output Total  850 ml 


 


Balance 720 ml -250 ml 











Current Medications:


I have reviewed the current psychotropics carefully including drug interactions.

 Risk benefit ratio favors no change other than as noted in my dictated progress

note.





Diagnosis:


Problems:  


(1) Anxiety disorder


(2) Dementia in Alzheimer's disease with delusions


(3) Dementia in Alzheimer's disease with depression


(4) Dementia, vascular, with delusions


(5) Dementia, vascular, with depression


(6) Impulse control disorder











DEVAUGHN CACERES MD                  Oct 8, 2019 21:52

## 2019-10-08 NOTE — PN
DATE:  10/07/2019



SUBJECTIVE:  The patient was seen today, met with the staff, chart reviewed and

also covering for Dr. Casas.  The staff reports no major behavior problems.  She

is able to feed herself.  The patient also has indwelling catheter.



The patient's lab showed BUN of 26.  The patient's Depakote level was 21.  The

patient denies of any other major medical issues at this time.



OBSERVATION:

VITAL SIGNS:  Temperature 97.3, blood pressure 112/76, pulse 69, respirations

16, O2 sat 95%.  Slept about 7 hours last night.  The patient's appetite

improved.



MEDICATIONS:  The patient's current medications include Depakote 500 mg at

night, mirtazapine 5 mg b.i.d..  The patient is on Zoloft 50 mg daily,

mirtazapine 7.5 mg at night.  She is on olanzapine 2.5 mg q. 2 hours p.r.n.,

Namenda 10 mg b.i.d., Aricept 10 mg at night and also Depakote 125 mg twice a

day.  The patient is not having any side effects to medications.



ASSESSMENT:

1.  Major neurocognitive disorder, most likely Alzheimer's with delusions,

depression and behavioral disturbances.

2.  Anxiety disorder, unspecified.



PLAN:  To continue with the treatment.  Awaiting placement.





______________________________

TWILA MAGUIRE MD



DR:  ADALGISA/hannah  JOB#:  [id number] / [id number]

DD:  10/07/2019 16:47  DT:  10/08/2019 09:21

## 2019-10-09 VITALS — DIASTOLIC BLOOD PRESSURE: 85 MMHG | SYSTOLIC BLOOD PRESSURE: 128 MMHG

## 2019-10-09 VITALS — DIASTOLIC BLOOD PRESSURE: 79 MMHG | SYSTOLIC BLOOD PRESSURE: 119 MMHG

## 2019-10-09 LAB
ANION GAP SERPL CALC-SCNC: 5 MMOL/L (ref 6–14)
BASOPHILS # BLD AUTO: 0 X10^3/UL (ref 0–0.2)
BASOPHILS NFR BLD: 1 % (ref 0–3)
CA-I SERPL ISE-MCNC: 20 MG/DL (ref 7–20)
CALCIUM SERPL-MCNC: 9.1 MG/DL (ref 8.5–10.1)
CHLORIDE SERPL-SCNC: 103 MMOL/L (ref 98–107)
CO2 SERPL-SCNC: 29 MMOL/L (ref 21–32)
CREAT SERPL-MCNC: 0.8 MG/DL (ref 0.6–1)
EOSINOPHIL NFR BLD: 0.1 X10^3/UL (ref 0–0.7)
EOSINOPHIL NFR BLD: 2 % (ref 0–3)
ERYTHROCYTE [DISTWIDTH] IN BLOOD BY AUTOMATED COUNT: 12.9 % (ref 11.5–14.5)
GFR SERPLBLD BASED ON 1.73 SQ M-ARVRAT: 72.7 ML/MIN
GLUCOSE SERPL-MCNC: 93 MG/DL (ref 70–99)
HCT VFR BLD CALC: 36.9 % (ref 36–47)
HGB BLD-MCNC: 12.5 G/DL (ref 12–15.5)
LYMPHOCYTES # BLD: 1.5 X10^3/UL (ref 1–4.8)
LYMPHOCYTES NFR BLD AUTO: 28 % (ref 24–48)
MCH RBC QN AUTO: 31 PG (ref 25–35)
MCHC RBC AUTO-ENTMCNC: 34 G/DL (ref 31–37)
MCV RBC AUTO: 92 FL (ref 79–100)
MONO #: 0.4 X10^3/UL (ref 0–1.1)
MONOCYTES NFR BLD: 8 % (ref 0–9)
NEUT #: 3.3 X10^3UL (ref 1.8–7.7)
NEUTROPHILS NFR BLD AUTO: 61 % (ref 31–73)
PLATELET # BLD AUTO: 252 X10^3/UL (ref 140–400)
POTASSIUM SERPL-SCNC: 4.3 MMOL/L (ref 3.5–5.1)
RBC # BLD AUTO: 3.99 X10^6/UL (ref 3.5–5.4)
SODIUM SERPL-SCNC: 137 MMOL/L (ref 136–145)
WBC # BLD AUTO: 5.4 X10^3/UL (ref 4–11)

## 2019-10-09 RX ADMIN — HYDROCORTISONE SCH APP: 25 OINTMENT TOPICAL at 09:00

## 2019-10-09 RX ADMIN — ASPIRIN SCH MG: 81 TABLET, COATED ORAL at 09:29

## 2019-10-09 RX ADMIN — MEMANTINE HYDROCHLORIDE SCH MG: 10 TABLET ORAL at 09:29

## 2019-10-09 RX ADMIN — DIVALPROEX SODIUM SCH MG: 125 CAPSULE, COATED PELLETS ORAL at 09:29

## 2019-10-09 RX ADMIN — SERTRALINE HYDROCHLORIDE SCH MG: 50 TABLET ORAL at 09:29

## 2019-10-09 RX ADMIN — HYDROCORTISONE SCH APP: 25 OINTMENT TOPICAL at 20:59

## 2019-10-09 RX ADMIN — MIRTAZAPINE SCH MG: 7.5 TABLET, FILM COATED ORAL at 20:59

## 2019-10-09 RX ADMIN — DIVALPROEX SODIUM SCH MG: 125 CAPSULE, COATED PELLETS ORAL at 18:25

## 2019-10-09 RX ADMIN — LIDOCAINE SCH PATCH: 50 PATCH CUTANEOUS at 09:28

## 2019-10-09 RX ADMIN — MEMANTINE HYDROCHLORIDE SCH MG: 10 TABLET ORAL at 20:59

## 2019-10-09 RX ADMIN — DONEPEZIL HYDROCHLORIDE SCH MG: 10 TABLET ORAL at 20:59

## 2019-10-09 NOTE — PDOC
Exam


Note:


Santos Note:


Please also refer to the separate dictated note~for this date of service 

dictated separately.~Patient seen individually. Discussed the patient with 

Nursing staff reviewed the chart.~Reviewed interim history and current 

functioning. Reviewed vital signs,~Labs/ Radiology~and current medications noted

below. Continue current treatment with the changes noted in the dictated 

addendum note





Assessment:


Vital Signs/I&O:





                                   Vital Signs








  Date Time  Temp Pulse Resp B/P (MAP) Pulse Ox O2 Delivery O2 Flow Rate FiO2


 


10/9/19 16:26 97.7 62 16 128/85 (99) 96   


 


10/8/19 05:15      Room Air  














                                    I & O   


 


 10/8/19 10/8/19 10/9/19





 15:00 23:00 07:00


 


Intake Total 720 ml 720 ml 


 


Balance 720 ml 720 ml 








Labs:





                                Laboratory Tests








Test


 10/9/19


05:55


 


White Blood Count


 5.4 x10^3/uL


(4.0-11.0)


 


Red Blood Count


 3.99 x10^6/uL


(3.50-5.40)


 


Hemoglobin


 12.5 g/dL


(12.0-15.5)


 


Hematocrit


 36.9 %


(36.0-47.0)


 


Mean Corpuscular Volume


 92 fL ()





 


Mean Corpuscular Hemoglobin 31 pg (25-35)  


 


Mean Corpuscular Hemoglobin


Concent 34 g/dL


(31-37)


 


Red Cell Distribution Width


 12.9 %


(11.5-14.5)


 


Platelet Count


 252 x10^3/uL


(140-400)


 


Neutrophils (%) (Auto) 61 % (31-73)  


 


Lymphocytes (%) (Auto) 28 % (24-48)  


 


Monocytes (%) (Auto) 8 % (0-9)  


 


Eosinophils (%) (Auto) 2 % (0-3)  


 


Basophils (%) (Auto) 1 % (0-3)  


 


Neutrophils # (Auto)


 3.3 x10^3uL


(1.8-7.7)


 


Lymphocytes # (Auto)


 1.5 x10^3/uL


(1.0-4.8)


 


Monocytes # (Auto)


 0.4 x10^3/uL


(0.0-1.1)


 


Eosinophils # (Auto)


 0.1 x10^3/uL


(0.0-0.7)


 


Basophils # (Auto)


 0.0 x10^3/uL


(0.0-0.2)


 


Sodium Level


 137 mmol/L


(136-145)


 


Potassium Level


 4.3 mmol/L


(3.5-5.1)


 


Chloride Level


 103 mmol/L


()


 


Carbon Dioxide Level


 29 mmol/L


(21-32)


 


Anion Gap 5 (6-14)  L


 


Blood Urea Nitrogen


 20 mg/dL


(7-20)


 


Creatinine


 0.8 mg/dL


(0.6-1.0)


 


Estimated GFR


(Cockcroft-Gault) 72.7  





 


Glucose Level


 93 mg/dL


(70-99)


 


Calcium Level


 9.1 mg/dL


(8.5-10.1)











Current Medications:


I have reviewed the current psychotropics carefully including drug interactions.

 Risk benefit ratio favors no change other than as noted in my dictated progress

note.





Diagnosis:


Problems:  


(1) Anxiety disorder


(2) Dementia in Alzheimer's disease with delusions


(3) Dementia in Alzheimer's disease with depression


(4) Dementia, vascular, with delusions


(5) Dementia, vascular, with depression


(6) Impulse control disorder











DEVAUGHN CACERES MD                  Oct 9, 2019 21:58

## 2019-10-09 NOTE — PN
DATE:  10/04/2019



PSYCHIATRIC PROGRESS NOTE



This late entry 10/04/2019 covers elements not covered in my initial note.



SUBJECTIVE:  I met with the patient evening of 10/04/2019.  Per Joanna RN,

patient slept 6-3/4 hours previous night.  She has been retaining urine and did

get a straight catheterization at night and in the morning and then a Mccoy was

placed.  We will defer to Dr. Weston/Dr. Harrison.  She is confused, tearful,

talks "gibberish" per nursing report.



REVIEW OF SYSTEMS:  No CV, , pulmonary, eye, ENT system symptoms on review. 

Reliability poor.  Gait unsteady.  Opal lift Broda chair.



MENTAL STATUS EXAM:  Oriented to herself.  Insight, judgment, recent and remote

memory, attention, concentration, fund of knowledge poor, consistent with her

diagnosis mentioned in my initial note.



PLAN:  No change from initial note.





______________________________

MAN ART CACERES MD



DR:  BIBI/hannah  JOB#:  [id number] / [id number]

DD:  10/08/2019 12:06  DT:  10/09/2019 01:11

## 2019-10-09 NOTE — PN
DATE:  10/05/2019



PSYCHIATRIC PROGRESS NOTE



This late entry 10/05/2019 covers elements not covered in my initial note.



SUBJECTIVE:  I met with the patient in the morning.  According to Lanny RN, the

patient slept 7-1/4 hours previous night.  She did have a bath and fed herself

breakfast in the morning, which is an improvement.  Speech is disorganized.  UA

has reflux to culture.



REVIEW OF SYSTEMS:  Ambulation impaired and Opal lift Broda chair.  No CV, ,

PULMONARY, EYE, ENT system symptoms on review.  Reliability poor.



MENTAL STATUS EXAM:  Oriented to herself.  Insight, judgment, recent and remote

memory, attention, concentration, fund of knowledge poor, consistent with her

diagnosis mentioned in my initial note.



PLAN:  No change from initial note.  Treat the UTI once this returns.  Rest

unchanged.





______________________________

MAN ART CACERES MD



DR:  BIBI/hannah  JOB#:  [id number] / [id number]

DD:  10/08/2019 12:46  DT:  10/09/2019 01:49

## 2019-10-10 VITALS — SYSTOLIC BLOOD PRESSURE: 114 MMHG | DIASTOLIC BLOOD PRESSURE: 76 MMHG

## 2019-10-10 VITALS — SYSTOLIC BLOOD PRESSURE: 112 MMHG | DIASTOLIC BLOOD PRESSURE: 77 MMHG

## 2019-10-10 LAB
ANION GAP SERPL CALC-SCNC: 6 MMOL/L (ref 6–14)
APTT PPP: (no result) S
BACTERIA #/AREA URNS HPF: (no result) /HPF
BILIRUB UR QL STRIP: (no result)
CA-I SERPL ISE-MCNC: 27 MG/DL (ref 7–20)
CALCIUM SERPL-MCNC: 9.3 MG/DL (ref 8.5–10.1)
CHLORIDE SERPL-SCNC: 104 MMOL/L (ref 98–107)
CO2 SERPL-SCNC: 31 MMOL/L (ref 21–32)
CREAT SERPL-MCNC: 0.7 MG/DL (ref 0.6–1)
ERYTHROCYTE [DISTWIDTH] IN BLOOD BY AUTOMATED COUNT: 13.3 % (ref 11.5–14.5)
FIBRINOGEN PPP-MCNC: (no result) MG/DL
GFR SERPLBLD BASED ON 1.73 SQ M-ARVRAT: 84.8 ML/MIN
GLUCOSE SERPL-MCNC: 91 MG/DL (ref 70–99)
GLUCOSE UR STRIP-MCNC: (no result) MG/DL
HCT VFR BLD CALC: 38 % (ref 36–47)
HGB BLD-MCNC: 12.6 G/DL (ref 12–15.5)
MCH RBC QN AUTO: 31 PG (ref 25–35)
MCHC RBC AUTO-ENTMCNC: 33 G/DL (ref 31–37)
MCV RBC AUTO: 92 FL (ref 79–100)
NITRITE UR QL STRIP: (no result)
PLATELET # BLD AUTO: 272 X10^3/UL (ref 140–400)
POTASSIUM SERPL-SCNC: 4.1 MMOL/L (ref 3.5–5.1)
RBC # BLD AUTO: 4.12 X10^6/UL (ref 3.5–5.4)
RBC #/AREA URNS HPF: >40 /HPF (ref 0–2)
SODIUM SERPL-SCNC: 141 MMOL/L (ref 136–145)
SP GR UR STRIP: 1.01
SQUAMOUS #/AREA URNS LPF: (no result) /LPF
UROBILINOGEN UR-MCNC: 1 MG/DL
WBC # BLD AUTO: 4.7 X10^3/UL (ref 4–11)
WBC #/AREA URNS HPF: (no result) /HPF (ref 0–4)

## 2019-10-10 RX ADMIN — HYDROCORTISONE SCH APP: 25 OINTMENT TOPICAL at 20:25

## 2019-10-10 RX ADMIN — MEMANTINE HYDROCHLORIDE SCH MG: 10 TABLET ORAL at 10:00

## 2019-10-10 RX ADMIN — SERTRALINE HYDROCHLORIDE SCH MG: 50 TABLET ORAL at 10:00

## 2019-10-10 RX ADMIN — DIVALPROEX SODIUM SCH MG: 125 CAPSULE, COATED PELLETS ORAL at 18:36

## 2019-10-10 RX ADMIN — DONEPEZIL HYDROCHLORIDE SCH MG: 10 TABLET ORAL at 20:25

## 2019-10-10 RX ADMIN — DIVALPROEX SODIUM SCH MG: 125 CAPSULE, COATED PELLETS ORAL at 10:00

## 2019-10-10 RX ADMIN — LIDOCAINE SCH PATCH: 50 PATCH CUTANEOUS at 10:00

## 2019-10-10 RX ADMIN — MEMANTINE HYDROCHLORIDE SCH MG: 10 TABLET ORAL at 20:25

## 2019-10-10 RX ADMIN — HYDROCORTISONE SCH APP: 25 OINTMENT TOPICAL at 09:00

## 2019-10-10 RX ADMIN — ASPIRIN SCH MG: 81 TABLET, COATED ORAL at 09:59

## 2019-10-10 RX ADMIN — MIRTAZAPINE SCH MG: 7.5 TABLET, FILM COATED ORAL at 20:25

## 2019-10-10 NOTE — RAD
Exam: CT abdomen and pelvis without contrast

 

INDICATION: Hematuria

 

TECHNIQUE: Sequential axial images through the abdomen and pelvis obtained

without IV contrast. Sagittal and coronal reformatted images were 

reconstructed from the axial data and reviewed.

 

Comparisons: None

 

FINDINGS:

Heart size is normal. No pericardial effusion. Strandy opacities at the 

lung bases bilaterally, likely representing atelectasis. No pleural 

effusion.

 

Evaluation of solid abdominal organs is limited secondary to noncontrast 

technique.

 

Liver, spleen, pancreas, gallbladder and adrenals are unremarkable.

 

No perinephric inflammation or hydronephrosis. No renal or ureteral 

calculi are identified.

 

Bladder is decompressed not well evaluated. Uterus is not enlarged. No 

abnormal adnexal mass.

 

Large amount of stool is noted within the rectum, with mild adjacent wall 

thickening. The remainder of the large and small bowel are unremarkable 

without evidence for obstruction. No free intra-abdominal air or fluid. 

Appendix is normal. No obstruction.

 

Abdominal aorta has a normal course and caliber.

 

No enlarged abdominal lymph nodes are identified.

 

No suspicious osseous lesions or acute fractures.

 

IMPRESSION:

1.  No renal or ureteral calculi. No evidence for obstructive uropathy.

2.  Large amount stool in the rectum with mild associated wall thickening 

and mild adjacent perirectal fat stranding. Findings are favored represent

stercoral colitis

 

 

Exposure: One or more of the following in the visualized dose reduction 

techniques were utilized for this examination:

1.  Automated exposure control

2.  Adjustment of the MA and/or KV according to patient size

3.  Use of iterative of reconstructive technique

 

Electronically signed by: Evgeny Parker MD (10/10/2019 7:24 PM) [facility]

## 2019-10-10 NOTE — PN
DATE:  10/08/2019



PSYCHIATRIC PROGRESS NOTE



This late entry 10/08/2019 covers the elements not covered in my initial note.



SUBJECTIVE:  I met with the patient in the evening of 10/08/2019.  Reviewed

information from Dr. Palencia who covered for me for the prior 2 days.  The

patient slept 7 hours previous night.  She is able to feed herself, remains

extremely confused, but more pleasant, smiling as I met with her, takes her meds

crushed in ice cream.  She talks in a sing song manner.  Most communication is

yes and no.  Social service staff are coordinating placement for her since she

is unable to return home as the  was unable to provide the care for her

level of functioning at this time.  Apparently, the insurance company is not

authorizing further days and I will be doing a peer review on 10/09/2019.



REVIEW OF SYSTEMS:  She is not forthcoming when questioned, but ambulation

impaired, in Broda chair.  No CV, , pulmonary, eye, ENT system symptoms on

review.



MENTAL STATUS EXAM:  Oriented to herself.  Insight, judgment, recent and remote

memory, attention, concentration, fund of knowledge poor, consistent with her

diagnoses.



IMPRESSION:  Major neurocognitive disorder, Alzheimer, vascular with delusion,

depression, behavioral disturbance; anxiety disorder, unspecified; impulse

control disorder, unspecified; urinary tract infection.



PLAN:  Continue current psychotropics.  Namenda and Aricept may need to be

stopped later since they probably have little benefit at her stage of dementia. 

We will maintain Zoloft along with Zyprexa p.r.n., Remeron scheduled and

Depakote 125 mg twice a day, may need to adjust this to help with her mood

stability and agitation.





______________________________

DEVAUGHN CACERES MD



DR:  BIBI/hannah  JOB#:  [id number] / [id number]

DD:  10/09/2019 16:20  DT:  10/10/2019 04:28

## 2019-10-10 NOTE — PDOC
Exam


Note:


Santos Note:


Please also refer to the separate dictated note~for this date of service 

dictated separately.~Patient seen individually. Discussed the patient with 

Nursing staff reviewed the chart.~Reviewed interim history and current 

functioning. Reviewed vital signs,~Labs/ Radiology~and current medications noted

below. Continue current treatment with the changes noted in the dictated 

addendum note





Assessment:


Vital Signs/I&O:





                                   Vital Signs








  Date Time  Temp Pulse Resp B/P (MAP) Pulse Ox O2 Delivery O2 Flow Rate FiO2


 


10/10/19 17:35 97.8 72 16 114/76 (89) 98 Room Air  














                                    I & O   


 


 10/9/19 10/9/19 10/10/19





 15:00 23:00 07:00


 


Intake Total 240 ml 600 ml 


 


Output Total  1150 ml 


 


Balance 240 ml -550 ml 








Labs:





                                Laboratory Tests








Test


 10/10/19


06:56 10/10/19


16:30


 


White Blood Count


 4.7 x10^3/uL


(4.0-11.0) 





 


Red Blood Count


 4.12 x10^6/uL


(3.50-5.40) 





 


Hemoglobin


 12.6 g/dL


(12.0-15.5) 





 


Hematocrit


 38.0 %


(36.0-47.0) 





 


Mean Corpuscular Volume


 92 fL ()


 





 


Mean Corpuscular Hemoglobin 31 pg (25-35)   


 


Mean Corpuscular Hemoglobin


Concent 33 g/dL


(31-37) 





 


Red Cell Distribution Width


 13.3 %


(11.5-14.5) 





 


Platelet Count


 272 x10^3/uL


(140-400) 





 


Sodium Level


 141 mmol/L


(136-145) 





 


Potassium Level


 4.1 mmol/L


(3.5-5.1) 





 


Chloride Level


 104 mmol/L


() 





 


Carbon Dioxide Level


 31 mmol/L


(21-32) 





 


Anion Gap 6 (6-14)   


 


Blood Urea Nitrogen


 27 mg/dL


(7-20)  H 





 


Creatinine


 0.7 mg/dL


(0.6-1.0) 





 


Estimated GFR


(Cockcroft-Gault) 84.8  


 





 


Glucose Level


 91 mg/dL


(70-99) 





 


Calcium Level


 9.3 mg/dL


(8.5-10.1) 





 


Urine Collection Type  U cath  


 


Urine Color  Brown  


 


Urine Clarity  Turbid  


 


Urine pH  >8.5  


 


Urine Specific Gravity  1.010  


 


Urine Protein


 


 >100 mg/dl


(NEG-TRACE)


 


Urine Glucose (UA)


 


 Neg mg/dL


(NEG)


 


Urine Ketones (Stick)


 


 Neg mg/dL


(NEG)


 


Urine Blood  Large (NEG)  


 


Urine Nitrite  Neg (NEG)  


 


Urine Bilirubin  Neg (NEG)  


 


Urine Urobilinogen Dipstick


 


 1 mg/dL (0.2


mg/dL)


 


Urine Leukocyte Esterase  Trace (NEG)  


 


Urine RBC


 


 >40 /HPF (0-2)





 


Urine WBC


 


 Rare /HPF


(0-4)


 


Urine Squamous Epithelial


Cells 


 None /LPF  





 


Urine Bacteria


 


 Few /HPF


(0-FEW)











Current Medications:


Meds:





Current Medications








 Medications


  (Trade)  Dose


 Ordered  Sig/Sascha


 Route


 PRN Reason  Start Time


 Stop Time Status Last Admin


Dose Admin


 


 Vitamin D


  (Vitamin D3)  50,000 unit  WEEKLY


 PO


   10/10/19 09:00


    10/10/19 10:03











I have reviewed the current psychotropics carefully including drug interactions.

 Risk benefit ratio favors no change other than as noted in my dictated progress

note.





Diagnosis:


Problems:  


(1) Anxiety disorder


(2) Dementia in Alzheimer's disease with delusions


(3) Dementia in Alzheimer's disease with depression


(4) Dementia, vascular, with delusions


(5) Dementia, vascular, with depression


(6) Impulse control disorder











DEVAUGHN CACERES MD                 Oct 10, 2019 21:58

## 2019-10-11 VITALS — SYSTOLIC BLOOD PRESSURE: 112 MMHG | DIASTOLIC BLOOD PRESSURE: 71 MMHG

## 2019-10-11 VITALS — SYSTOLIC BLOOD PRESSURE: 99 MMHG | DIASTOLIC BLOOD PRESSURE: 67 MMHG

## 2019-10-11 RX ADMIN — ACETAMINOPHEN PRN MG: 325 TABLET, FILM COATED ORAL at 09:57

## 2019-10-11 RX ADMIN — MEMANTINE HYDROCHLORIDE SCH MG: 10 TABLET ORAL at 07:53

## 2019-10-11 RX ADMIN — HYDROCORTISONE SCH APP: 25 OINTMENT TOPICAL at 07:55

## 2019-10-11 RX ADMIN — LIDOCAINE SCH PATCH: 50 PATCH CUTANEOUS at 07:54

## 2019-10-11 RX ADMIN — DIVALPROEX SODIUM SCH MG: 125 CAPSULE, COATED PELLETS ORAL at 15:35

## 2019-10-11 RX ADMIN — SERTRALINE HYDROCHLORIDE SCH MG: 50 TABLET ORAL at 07:53

## 2019-10-11 RX ADMIN — ASPIRIN SCH MG: 81 TABLET, COATED ORAL at 07:53

## 2019-10-11 RX ADMIN — DIVALPROEX SODIUM SCH MG: 125 CAPSULE, COATED PELLETS ORAL at 07:53

## 2019-10-11 NOTE — PDOC
Exam


Note:


Santos Note:


Please also refer to the separate dictated note~for this date of service 

dictated separately.~Patient seen individually. Discussed the patient with 

Nursing staff reviewed the chart.~Reviewed interim history and current 

functioning. Reviewed vital signs,~Labs/ Radiology~and current medications noted

below. Continue current treatment with the changes noted in the dictated 

addendum note





Assessment:


Vital Signs/I&O:





                                   Vital Signs








  Date Time  Temp Pulse Resp B/P (MAP) Pulse Ox O2 Delivery O2 Flow Rate FiO2


 


10/11/19 15:55 97.8 57 16 99/67 (78) 97 Room Air  














                                    I & O   


 


 10/10/19 10/10/19 10/11/19





 15:00 23:00 07:00


 


Intake Total 720 ml 360 ml 120 ml


 


Output Total  700 ml 200 ml


 


Balance 720 ml -340 ml -80 ml











Current Medications:


I have reviewed the current psychotropics carefully including drug interactions.

 Risk benefit ratio favors no change other than as noted in my dictated progress

note.





Diagnosis:


Problems:  


(1) Anxiety disorder


(2) Dementia in Alzheimer's disease with delusions


(3) Dementia in Alzheimer's disease with depression


(4) Dementia, vascular, with delusions


(5) Dementia, vascular, with depression


(6) Impulse control disorder











DEVAUGHN CACERES MD                 Oct 11, 2019 21:37

## 2019-10-11 NOTE — DS
DATE OF DISCHARGE:  10/11/2019



PSYCHIATRIC PROGRESS NOTE



REASON FOR ADMISSION:  Please refer to the admission history for details. 

Briefly, the patient is a 64-year-old  female admitted from home via

the Emergency Room at [facility] after she presented there with

her  who is a primary caregiver at home consequent to her very

significant dementia, Alzheimer's vascular.  She has been constantly yelling,

marked insomnia, not eating, drinking, restless, recently came off hospice. 

Behaviors were unmanageable at home, failed outpatient psychiatric interventions

resulting in this referral.



SIGNIFICANT FINDINGS AND CLINICAL COURSE:  Following admission, the patient was

seen daily individually by myself from a psychiatric standpoint.  Medical follow

up with Dr. Harrison.  She was found to have a UTI at [facility] and

treated.  She is extremely confused with marked mood lability and she was on

Zyprexa 20 mg at bedtime scheduled, but this was discontinued.  She appeared

depressed and seemed to stabilize on a combination of Zoloft 50 mg a day,

Namenda 10 b.i.d., Aricept 10 mg a day, Zyprexa p.r.n., Depakote Sprinkles 125

mg twice a day, Remeron 7.5 mg at bedtime.  Consideration may be given to

stopping the Namenda and Aricept at a future date given the extent of her

dementia.  Gradually mood appeared to improve.  She is pleasant, smiling,

slightly confused, but not aggressive.



REVIEW OF SYSTEMS:  Prior to discharge, ambulation impaired, in wheelchair.  No

CV, , pulmonary, eye, ENT system symptoms on review.



MENTAL STATUS EXAM:  Oriented to herself.  Insight, judgment, recent and remote

memory, attention, concentration, fund of knowledge poor, consistent with her

diagnosis.



FINAL DIAGNOSES:  Major neurocognitive disorder, Alzheimer, vascular with

delusion, depression; behavioral disturbance; anxiety disorder, unspecified;

impulse control disorder, unspecified.  Rest unchanged from admission.



DISCHARGE MEDICATIONS:  Please refer to the [facility]AD.



DISCHARGE FOLLOWUP:  Outpatient psychiatric and medical followup as arranged.



Time for discharge day management greater than 30 minutes.





______________________________

MAN ART CACERES MD



DR:  BIBI/hannah  JOB#:  [id number] / [id number]

DD:  10/11/2019 22:27  DT:  10/11/2019 22:44

## 2019-10-12 NOTE — PN
DATE:  10/09/2019



PSYCHIATRIC PROGRESS NOTE



This late entry, 10/09, covers elements not covered in my initial note.



SUBJECTIVE:  I met with the patient evening of 10/09.  According to SHANDA Jensen, patient slept 6-3/4 hours previous night.  She remains confused in her Broda

chair, but more pleasant, singing out at times.  There is blood in her urine. 

We will defer to Dr. Harrison.  I had a message to call Dr. Camacho for prior

authorization for her ongoing inpatient hospitalization and I left a detailed

message for Dr. Camacho after we were not able to connect on the telephone.



REVIEW OF SYSTEMS:  Ambulation impaired.  No CV, , pulmonary, eye system

symptoms on review.  Reliability poor.



MENTAL STATUS EXAM:  Oriented to herself.  Insight, judgment, recent and remote

memory, attention, concentration, fund of knowledge poor, consistent with her

diagnoses.



IMPRESSION:  Major neurocognitive disorder; Alzheimer, vascular with delusion;

depression; behavioral disturbance; anxiety disorder, unspecified; impulse

control disorder, unspecified.



PLAN:  Continue current psychotropics, Namenda, Aricept, Depakote, Remeron,

along with Zyprexa p.r.n.  The scheduled Zyprexa 20 mg at bedtime has since been

discontinued and we do not see an emergence of active psychosis or

hallucinations.  We will continue to monitor.  I would like to adjust the

Depakote somewhat further, but generally she is showing improvement stability

for her agitation.





______________________________

DEVAUGHN CACERES MD



DR:  BIBI/hannah  JOB#:  [id number] / [id number]

DD:  10/11/2019 09:46  DT:  10/12/2019 01:17

## 2019-10-12 NOTE — PN
DATE:  10/10/2019



PSYCHIATRIC PROGRESS NOTE



This late entry 10/10/2019 covers the elements not covered in my initial note.



SUBJECTIVE:  I met with the patient in the evening of 10/10/2019 and staffed at

a treatment team meeting with the entire team.  The patient slept 6-3/4 hours. 

Appetite is 75%.  She is confused, calm, cooperative with meds and assessment. 

Smiling, disorganized.  There is blood in the urine.  We will defer to Dr. Harrison.



REVIEW OF SYSTEMS:  No CV, , pulmonary, eye, ENT system symptoms on review. 

Gait unsteady, in Broda chair.



MENTAL STATUS EXAM:  Oriented to herself.  Insight, judgment, recent and remote

memory, attention, concentration, fund of knowledge poor, consistent with her

diagnosis mentioned in my initial note.



PLAN:  No change from initial note.





______________________________

MAN ART CACERES MD



DR:  BIBI/hannah  JOB#:  [id number] / [id number]

DD:  10/11/2019 09:48  DT:  10/12/2019 01:21

## 2022-07-07 NOTE — PN
DATE:  10/02/2019



PSYCHIATRIC PROGRESS NOTE



This late entry 10/02/2019 covers elements not covered in my initial note.



SUBJECTIVE:  I met with the patient evening of 10/02/2019.  According to

SHANDA Jensen, the patient slept reasonably previous night.  She was agitated

earlier in the day, received Zyprexa and Tylenol at 11:06 a.m.



REVIEW OF SYSTEMS:  Ambulation impaired.  No CV, , pulmonary, eye, ENT system

symptoms on review.  Reliability poor.



MENTAL STATUS EXAMINATION:  Oriented to herself.  Insight, judgment, recent and

remote memory, attention, concentration, fund of knowledge poor, consistent with

her diagnosis mentioned in my initial note.



PLAN:  No change from initial note.





______________________________

MAN ART CACERES MD



DR:  BIBI/hannah  JOB#:  [id number] / [id number]

DD:  10/04/2019 13:48  DT:  10/04/2019 22:01 5-Fu Counseling: 5-Fluorouracil Counseling:  I discussed with the patient the risks of 5-fluorouracil including but not limited to erythema, scaling, itching, weeping, crusting, and pain.